# Patient Record
Sex: FEMALE
[De-identification: names, ages, dates, MRNs, and addresses within clinical notes are randomized per-mention and may not be internally consistent; named-entity substitution may affect disease eponyms.]

---

## 2021-09-17 PROBLEM — Z00.00 ENCOUNTER FOR PREVENTIVE HEALTH EXAMINATION: Status: ACTIVE | Noted: 2021-09-17

## 2021-09-22 ENCOUNTER — APPOINTMENT (OUTPATIENT)
Dept: PLASTIC SURGERY | Facility: CLINIC | Age: 61
End: 2021-09-22
Payer: SELF-PAY

## 2021-09-22 PROCEDURE — 99499 UNLISTED E&M SERVICE: CPT

## 2021-09-22 NOTE — HISTORY OF PRESENT ILLNESS
[FreeTextEntry1] : 61 y/o female present for initial consultation for abdominoplasty.\par \par PSH of liposuction.\par Otherwise in good health.\par \par Exam: large overhanging abdominal pannus. Excessive upper abdominal adipose tissue.\par \par Not ideal candidate for abdominoplasty at current weight given significant adipose deposits over anterior abdomen. This would significantly limit quality of aesthetic outcome.\par Advised to decrease weight prior to scheduling surgery.\par Follow up in 3 months or PRN to reassess.